# Patient Record
Sex: FEMALE | Race: WHITE | URBAN - METROPOLITAN AREA
[De-identification: names, ages, dates, MRNs, and addresses within clinical notes are randomized per-mention and may not be internally consistent; named-entity substitution may affect disease eponyms.]

---

## 2021-03-24 ENCOUNTER — NEW PATIENT (OUTPATIENT)
Dept: URBAN - METROPOLITAN AREA CLINIC 10 | Facility: CLINIC | Age: 56
End: 2021-03-24
Payer: COMMERCIAL

## 2021-03-24 DIAGNOSIS — H53.033 STRABISMIC AMBLYOPIA, BILATERAL: ICD-10-CM

## 2021-03-24 DIAGNOSIS — H18.513 FUCHS' DYSTROPHY: Primary | ICD-10-CM

## 2021-03-24 PROCEDURE — 92025 CPTRIZED CORNEAL TOPOGRAPHY: CPT | Performed by: OPHTHALMOLOGY

## 2021-03-24 PROCEDURE — 92286 ANT SGM IMG I&R SPECLR MIC: CPT | Performed by: OPHTHALMOLOGY

## 2021-03-24 PROCEDURE — 99204 OFFICE O/P NEW MOD 45 MIN: CPT | Performed by: OPHTHALMOLOGY

## 2021-03-24 PROCEDURE — 76514 ECHO EXAM OF EYE THICKNESS: CPT | Performed by: OPHTHALMOLOGY

## 2021-03-24 ASSESSMENT — VISUAL ACUITY
OD: 20/30
OS: 20/30

## 2021-04-16 ENCOUNTER — TESTING ONLY (OUTPATIENT)
Dept: URBAN - METROPOLITAN AREA CLINIC 24 | Facility: CLINIC | Age: 56
End: 2021-04-16
Payer: COMMERCIAL

## 2021-04-16 DIAGNOSIS — H17.13 CENTRAL CORNEAL OPACITY, BILATERAL: ICD-10-CM

## 2021-04-16 PROCEDURE — 92025 CPTRIZED CORNEAL TOPOGRAPHY: CPT | Performed by: OPHTHALMOLOGY

## 2021-04-16 PROCEDURE — 99203 OFFICE O/P NEW LOW 30 MIN: CPT | Performed by: PHYSICIAN ASSISTANT

## 2021-04-16 PROCEDURE — 92136 OPHTHALMIC BIOMETRY: CPT | Performed by: OPHTHALMOLOGY

## 2021-04-16 ASSESSMENT — PACHYMETRY
OS: 2.96
OS: 23.07
OD: 2.92
OD: 23.15

## 2021-05-04 ENCOUNTER — SURGERY (OUTPATIENT)
Dept: URBAN - METROPOLITAN AREA SURGERY 5 | Facility: SURGERY | Age: 56
End: 2021-05-04
Payer: COMMERCIAL

## 2021-05-04 PROCEDURE — 65756 CORNEAL TRNSPL ENDOTHELIAL: CPT | Performed by: OPHTHALMOLOGY

## 2021-05-05 ENCOUNTER — POST-OPERATIVE VISIT (OUTPATIENT)
Dept: URBAN - METROPOLITAN AREA CLINIC 24 | Facility: CLINIC | Age: 56
End: 2021-05-05
Payer: COMMERCIAL

## 2021-05-05 PROCEDURE — 99024 POSTOP FOLLOW-UP VISIT: CPT | Performed by: OPTOMETRIST

## 2021-05-05 ASSESSMENT — INTRAOCULAR PRESSURE: OS: 16

## 2021-05-05 NOTE — IMPRESSION/PLAN
Impression: S/P DMEK/PI; Cataract Extraction by phacoemulsification with IOL placement; Peripheral iridectomy OS - 1 Day. Encounter for surgical aftercare following surgery on a sense organ  Z48.810. Plan: Doing well.

## 2021-05-10 ENCOUNTER — POST-OPERATIVE VISIT (OUTPATIENT)
Dept: URBAN - METROPOLITAN AREA CLINIC 10 | Facility: CLINIC | Age: 56
End: 2021-05-10
Payer: COMMERCIAL

## 2021-05-10 PROCEDURE — 99024 POSTOP FOLLOW-UP VISIT: CPT | Performed by: OPHTHALMOLOGY

## 2021-05-10 ASSESSMENT — INTRAOCULAR PRESSURE: OS: 12

## 2021-05-10 NOTE — IMPRESSION/PLAN
Impression: Corneal transplant status: Z94.7.
s/p phaco-DMEK 5/4/21 OS Plan: POW#1, doing well, Graft attached 360. IOP adequate. Precautions reviewed, Cont Pred and ATs QID, d/c Oflox. RTC for suture removal, IOP check.

## 2021-06-11 ENCOUNTER — POST-OPERATIVE VISIT (OUTPATIENT)
Dept: URBAN - METROPOLITAN AREA CLINIC 10 | Facility: CLINIC | Age: 56
End: 2021-06-11
Payer: COMMERCIAL

## 2021-06-11 PROCEDURE — 99024 POSTOP FOLLOW-UP VISIT: CPT | Performed by: OPHTHALMOLOGY

## 2021-06-11 ASSESSMENT — INTRAOCULAR PRESSURE: OS: 12

## 2021-06-11 ASSESSMENT — VISUAL ACUITY: OS: 20/20

## 2021-06-11 NOTE — IMPRESSION/PLAN
Impression: Corneal transplant status: Z94.7.
s/p phaco-DMEK 5/4/21 OS Plan: POW#4, doing well, graft C/C. Sutures removed today, oflox gtt placed. Start oflox TID x 3 d then d/c. Continue PF QID until _7_/_5_/_21___, then decrease PF to TID OD, d/c Ketorolac. ATs prn. Precautions reviewed.   
Proceed with second eye as scheduled

## 2021-07-22 ENCOUNTER — ADULT PHYSICAL (OUTPATIENT)
Dept: URBAN - METROPOLITAN AREA CLINIC 24 | Facility: CLINIC | Age: 56
End: 2021-07-22
Payer: COMMERCIAL

## 2021-07-22 DIAGNOSIS — Z01.818 ENCOUNTER FOR OTHER PREPROCEDURAL EXAMINATION: Primary | ICD-10-CM

## 2021-07-22 DIAGNOSIS — H25.813 COMBINED FORMS OF AGE-RELATED CATARACT, BILATERAL: ICD-10-CM

## 2021-07-22 PROCEDURE — 99213 OFFICE O/P EST LOW 20 MIN: CPT | Performed by: PHYSICIAN ASSISTANT

## 2021-07-29 ENCOUNTER — SURGERY (OUTPATIENT)
Dept: URBAN - METROPOLITAN AREA SURGERY 12 | Facility: SURGERY | Age: 56
End: 2021-07-29
Payer: COMMERCIAL

## 2021-07-29 DIAGNOSIS — H25.811 COMBINED FORMS OF AGE-RELATED CATARACT, RIGHT EYE: ICD-10-CM

## 2021-07-29 DIAGNOSIS — H18.511 ENDOTHELIAL CORNEAL DYSTROPHY, RIGHT EYE: Primary | ICD-10-CM

## 2021-07-29 PROCEDURE — 65756 CORNEAL TRNSPL ENDOTHELIAL: CPT | Performed by: OPHTHALMOLOGY

## 2021-07-30 ENCOUNTER — POST-OPERATIVE VISIT (OUTPATIENT)
Dept: URBAN - METROPOLITAN AREA CLINIC 24 | Facility: CLINIC | Age: 56
End: 2021-07-30
Payer: COMMERCIAL

## 2021-07-30 DIAGNOSIS — Z48.810 ENCOUNTER FOR SURGICAL AFTERCARE FOLLOWING SURGERY ON A SENSE ORGAN: Primary | ICD-10-CM

## 2021-07-30 PROCEDURE — 99024 POSTOP FOLLOW-UP VISIT: CPT | Performed by: OPTOMETRIST

## 2021-07-30 ASSESSMENT — INTRAOCULAR PRESSURE: OD: 18

## 2021-07-30 NOTE — IMPRESSION/PLAN
Impression:  Encounter for surgical aftercare following surgery on a sense organ  Z48.810. Plan: Doing well, PO instructions reviewed.

## 2021-08-06 ENCOUNTER — OFFICE VISIT (OUTPATIENT)
Dept: URBAN - METROPOLITAN AREA CLINIC 10 | Facility: CLINIC | Age: 56
End: 2021-08-06
Payer: COMMERCIAL

## 2021-08-06 PROCEDURE — 99024 POSTOP FOLLOW-UP VISIT: CPT | Performed by: OPHTHALMOLOGY

## 2021-08-06 ASSESSMENT — INTRAOCULAR PRESSURE
OD: 20
OS: 20

## 2021-08-06 NOTE — IMPRESSION/PLAN
Impression: Corneal transplant status: Z94.7.
s/p phaco-DMEK 5/4/21 OS, OD 7/29/21 Plan: POW#1 OD, doing well, Graft attached 360, moderate MCE, will recheck next week to make sure resolving. IOP adequate. Precautions reviewed, Cont Pred QID, ketorolac TID weekly taper, d/c Oflox. POM#3, doing well, graft C/C.   Continue PF BID until _9_/_5_/_21___, then decrease PF to qd OS

## 2021-08-12 ENCOUNTER — POST-OPERATIVE VISIT (OUTPATIENT)
Dept: URBAN - METROPOLITAN AREA CLINIC 24 | Facility: CLINIC | Age: 56
End: 2021-08-12
Payer: COMMERCIAL

## 2021-08-12 PROCEDURE — 99024 POSTOP FOLLOW-UP VISIT: CPT | Performed by: OPHTHALMOLOGY

## 2021-08-12 ASSESSMENT — INTRAOCULAR PRESSURE
OS: 19
OD: 15

## 2021-08-12 NOTE — IMPRESSION/PLAN
Impression: Corneal transplant status: Z94.7.
s/p phaco-DMEK 5/4/21 OS, OD 7/29/21 Plan: POW#2 OD, doing well, Graft attached 360, will recheck in 2 weeks to make sure MCE resolving. IOP adequate. Precautions reviewed, Cont Pred QID, ketorolac BID weekly taper, d/c Oflox. POM#3, doing well, graft C/C.   Continue PF BID until _9_/_5_/_21___, then decrease PF to qd OS

## 2021-08-26 ENCOUNTER — OFFICE VISIT (OUTPATIENT)
Dept: URBAN - METROPOLITAN AREA CLINIC 24 | Facility: CLINIC | Age: 56
End: 2021-08-26
Payer: COMMERCIAL

## 2021-08-26 PROCEDURE — 99024 POSTOP FOLLOW-UP VISIT: CPT | Performed by: OPHTHALMOLOGY

## 2021-08-26 ASSESSMENT — KERATOMETRY
OD: 42.62
OS: 42.90

## 2021-08-26 ASSESSMENT — VISUAL ACUITY
OD: 20/20
OS: 20/20

## 2021-08-26 ASSESSMENT — INTRAOCULAR PRESSURE
OD: 12
OS: 21

## 2021-08-26 NOTE — IMPRESSION/PLAN
Impression: Corneal transplant status: Z94.7.
s/p phaco-DMEK 5/4/21 OS, OD 7/29/21 Plan: POW#3.5 OD, doing well, graft C/Cd, Cont Pred QID until 9/29/21 then decrease to TID. POM#3, doing well, graft C/C.   Continue PF BID until _9_/_5_/_21___, then decrease PF to qd OS

## 2021-10-07 ENCOUNTER — OFFICE VISIT (OUTPATIENT)
Dept: URBAN - METROPOLITAN AREA CLINIC 24 | Facility: CLINIC | Age: 56
End: 2021-10-07
Payer: COMMERCIAL

## 2021-10-07 DIAGNOSIS — H26.493 OTHER SECONDARY CATARACT, BILATERAL: ICD-10-CM

## 2021-10-07 PROCEDURE — 99024 POSTOP FOLLOW-UP VISIT: CPT | Performed by: OPHTHALMOLOGY

## 2021-10-07 ASSESSMENT — INTRAOCULAR PRESSURE
OD: 15
OS: 15

## 2021-10-07 NOTE — IMPRESSION/PLAN
Impression: Other secondary cataract, bilateral: H26.493. Plan: May need yag soon, if MRx not improved, may send back sooner for yag.

## 2021-10-07 NOTE — IMPRESSION/PLAN
Impression: Corneal transplant status: Z94.7.
s/p phaco-DMEK 5/4/21 OS, OD 7/29/21 Plan: POM#2.5 OD, doing well, graft C/C, Cont Pred TID until 10/29/21 then decrease to BID until 11/29/21 then decrease to qd until 7/29/22 then d/c. POM#5, doing well, graft C/C. Continue PF qD until _5_/_5_/_22___, then d/c. 

May see optom for updated refraction

## 2021-12-02 ENCOUNTER — OFFICE VISIT (OUTPATIENT)
Dept: URBAN - METROPOLITAN AREA CLINIC 24 | Facility: CLINIC | Age: 56
End: 2021-12-02
Payer: COMMERCIAL

## 2021-12-02 PROCEDURE — 99213 OFFICE O/P EST LOW 20 MIN: CPT | Performed by: OPHTHALMOLOGY

## 2021-12-02 ASSESSMENT — INTRAOCULAR PRESSURE
OS: 20
OD: 24

## 2021-12-02 NOTE — IMPRESSION/PLAN
Impression: Corneal transplant status: Z94.7.
s/p phaco-DMEK 5/4/21 OS, OD 7/29/21 Plan: POM#4 OD, doing well, graft C/C, Cont Pred qd until 7/29/22 then d/c. POM#5 OS, doing well, graft C/C. Cont Pred qD until _5_/_5_/_22___, then d/c.

## 2021-12-02 NOTE — IMPRESSION/PLAN
Impression: Other secondary cataract, bilateral: H26.493. Plan: Opacified capsule with option for YAG laser capsulotomy. Discussed procedure, risks, benefits and side effects. Patient agrees to YAG laser capsulotomy.  Schedule YAG laser OS first then OD with Dr. Lala Zheng

## 2022-01-13 ENCOUNTER — SURGERY (OUTPATIENT)
Dept: URBAN - METROPOLITAN AREA SURGERY 12 | Facility: SURGERY | Age: 57
End: 2022-01-13
Payer: COMMERCIAL

## 2022-01-13 PROCEDURE — 66821 AFTER CATARACT LASER SURGERY: CPT | Performed by: OPHTHALMOLOGY

## 2022-01-27 ENCOUNTER — SURGERY (OUTPATIENT)
Dept: URBAN - METROPOLITAN AREA SURGERY 12 | Facility: SURGERY | Age: 57
End: 2022-01-27
Payer: COMMERCIAL

## 2022-01-27 DIAGNOSIS — H26.491 OTHER SECONDARY CATARACT, RIGHT EYE: Primary | ICD-10-CM

## 2022-01-27 PROCEDURE — 66821 AFTER CATARACT LASER SURGERY: CPT | Performed by: OPHTHALMOLOGY

## 2022-03-14 ENCOUNTER — OFFICE VISIT (OUTPATIENT)
Dept: URBAN - METROPOLITAN AREA CLINIC 24 | Facility: CLINIC | Age: 57
End: 2022-03-14
Payer: COMMERCIAL

## 2022-03-14 DIAGNOSIS — H43.391 OTHER VITREOUS OPACITIES, RIGHT EYE: ICD-10-CM

## 2022-03-14 DIAGNOSIS — Z94.7 CORNEAL TRANSPLANT STATUS: ICD-10-CM

## 2022-03-14 PROCEDURE — 92134 CPTRZ OPH DX IMG PST SGM RTA: CPT | Performed by: STUDENT IN AN ORGANIZED HEALTH CARE EDUCATION/TRAINING PROGRAM

## 2022-03-14 PROCEDURE — 99024 POSTOP FOLLOW-UP VISIT: CPT | Performed by: STUDENT IN AN ORGANIZED HEALTH CARE EDUCATION/TRAINING PROGRAM

## 2022-03-14 ASSESSMENT — KERATOMETRY
OD: 42.67
OS: 42.65

## 2022-03-14 ASSESSMENT — VISUAL ACUITY
OD: 20/20
OS: 20/20

## 2022-03-14 ASSESSMENT — INTRAOCULAR PRESSURE
OD: 15
OS: 15

## 2022-03-14 NOTE — IMPRESSION/PLAN
Impression: Corneal transplant status: Z94.7.
s/p phaco-DMEK 5/4/21 OS, OD 7/29/21 Plan: OD, doing well, graft C/C, Cont Pred qd until 7/29/22 then d/c.  
OS, doing well, graft C/C. Cont Pred qD until 5/5/22 then d/c.

## 2022-03-14 NOTE — IMPRESSION/PLAN
Impression: Other vitreous opacities, right eye: H43.391. Plan: Incomplete PVD, likely induced from ride at [facility]. Reassured pt retina intact, no e/o break/detachment. 
Ed symptoms of RD/RT and to seek care asap if noted

## 2022-03-14 NOTE — IMPRESSION/PLAN
Impression: Retinal hemorrhage, right eye: H35.61.  Plan: likely hemorraghic PVD with onset after ride in [email or web address]

## 2022-04-04 ENCOUNTER — OFFICE VISIT (OUTPATIENT)
Dept: URBAN - METROPOLITAN AREA CLINIC 24 | Facility: CLINIC | Age: 57
End: 2022-04-04
Payer: COMMERCIAL

## 2022-04-04 DIAGNOSIS — H35.61 RETINAL HEMORRHAGE, RIGHT EYE: Primary | ICD-10-CM

## 2022-04-04 PROCEDURE — 99213 OFFICE O/P EST LOW 20 MIN: CPT | Performed by: STUDENT IN AN ORGANIZED HEALTH CARE EDUCATION/TRAINING PROGRAM

## 2022-04-04 PROCEDURE — 92250 FUNDUS PHOTOGRAPHY W/I&R: CPT | Performed by: STUDENT IN AN ORGANIZED HEALTH CARE EDUCATION/TRAINING PROGRAM

## 2022-04-04 ASSESSMENT — INTRAOCULAR PRESSURE
OD: 14
OS: 14

## 2022-04-04 NOTE — IMPRESSION/PLAN
Impression: Retinal hemorrhage, right eye: H35.61. Plan: Resolving disc heme, no new hemes. No schafers, no e/o retinal breaks or tears. 
Reviewed symptoms of RT/RD and to seek care asap if noted

## 2023-01-19 ENCOUNTER — OFFICE VISIT (OUTPATIENT)
Dept: URBAN - METROPOLITAN AREA CLINIC 24 | Facility: CLINIC | Age: 58
End: 2023-01-19
Payer: COMMERCIAL

## 2023-01-19 DIAGNOSIS — Z94.7 CORNEAL TRANSPLANT STATUS: Primary | ICD-10-CM

## 2023-01-19 DIAGNOSIS — H43.393 OTHER VITREOUS OPACITIES, BILATERAL: ICD-10-CM

## 2023-01-19 PROCEDURE — 99213 OFFICE O/P EST LOW 20 MIN: CPT | Performed by: OPHTHALMOLOGY

## 2023-01-19 ASSESSMENT — INTRAOCULAR PRESSURE
OD: 8
OS: 9

## 2023-01-19 NOTE — IMPRESSION/PLAN
Impression: Corneal transplant status: Z94.7.
s/p phaco-DMEK 5/4/21 OS, OD 7/29/21 Plan: Grafts clear OU, off pred. 
Cornea prn